# Patient Record
Sex: FEMALE | Race: BLACK OR AFRICAN AMERICAN | NOT HISPANIC OR LATINO
[De-identification: names, ages, dates, MRNs, and addresses within clinical notes are randomized per-mention and may not be internally consistent; named-entity substitution may affect disease eponyms.]

---

## 2019-07-02 PROBLEM — Z00.00 ENCOUNTER FOR PREVENTIVE HEALTH EXAMINATION: Status: ACTIVE | Noted: 2019-07-02

## 2019-07-03 ENCOUNTER — APPOINTMENT (OUTPATIENT)
Dept: SURGICAL ONCOLOGY | Facility: CLINIC | Age: 84
End: 2019-07-03
Payer: MEDICARE

## 2019-07-03 VITALS
OXYGEN SATURATION: 95 % | SYSTOLIC BLOOD PRESSURE: 94 MMHG | HEIGHT: 63 IN | HEART RATE: 106 BPM | WEIGHT: 105 LBS | BODY MASS INDEX: 18.61 KG/M2 | DIASTOLIC BLOOD PRESSURE: 72 MMHG

## 2019-07-03 DIAGNOSIS — C22.0 LIVER CELL CARCINOMA: ICD-10-CM

## 2019-07-03 PROCEDURE — 99205 OFFICE O/P NEW HI 60 MIN: CPT | Mod: GV

## 2019-07-03 NOTE — END OF VISIT
[Time Spent: ___ minutes] : I have spent [unfilled] minutes of face to face time with the patient [>50% of Time Spent on Counseling for ____] : Greater than 50% of the encounter time was spent on counseling for [unfilled] Statement Selected

## 2019-07-09 ENCOUNTER — OUTPATIENT (OUTPATIENT)
Dept: OUTPATIENT SERVICES | Facility: HOSPITAL | Age: 84
LOS: 1 days | Discharge: HOME | End: 2019-07-09

## 2019-07-09 ENCOUNTER — APPOINTMENT (OUTPATIENT)
Dept: HEMATOLOGY ONCOLOGY | Facility: CLINIC | Age: 84
End: 2019-07-09
Payer: MEDICARE

## 2019-07-09 VITALS
SYSTOLIC BLOOD PRESSURE: 86 MMHG | OXYGEN SATURATION: 98 % | HEART RATE: 156 BPM | RESPIRATION RATE: 19 BRPM | DIASTOLIC BLOOD PRESSURE: 66 MMHG | TEMPERATURE: 98.2 F

## 2019-07-09 DIAGNOSIS — F03.90 UNSPECIFIED DEMENTIA W/OUT BEHAVIORAL DISTURBANCE: ICD-10-CM

## 2019-07-09 DIAGNOSIS — I10 ESSENTIAL (PRIMARY) HYPERTENSION: ICD-10-CM

## 2019-07-09 DIAGNOSIS — Z51.5 ENCOUNTER FOR PALLIATIVE CARE: ICD-10-CM

## 2019-07-09 DIAGNOSIS — I26.99 OTHER PULMONARY EMBOLISM W/OUT ACUTE COR PULMONALE: ICD-10-CM

## 2019-07-09 DIAGNOSIS — C22.9 MALIGNANT NEOPLASM OF LIVER, NOT SPECIFIED AS PRIMARY OR SECONDARY: ICD-10-CM

## 2019-07-09 PROCEDURE — 99205 OFFICE O/P NEW HI 60 MIN: CPT | Mod: GV

## 2019-07-09 RX ORDER — PSYLLIUM HUSK 0.4 G
CAPSULE ORAL
Refills: 0 | Status: ACTIVE | COMMUNITY

## 2019-07-09 RX ORDER — SIMETHICONE CHEW TAB 80 MG 80 MG
80 TABLET ORAL
Refills: 0 | Status: ACTIVE | COMMUNITY

## 2019-07-09 RX ORDER — MULTIVITAMIN
TABLET ORAL
Refills: 0 | Status: ACTIVE | COMMUNITY

## 2019-07-10 PROBLEM — Z51.5 PALLIATIVE CARE BY SPECIALIST: Status: ACTIVE | Noted: 2019-07-10

## 2019-07-11 PROBLEM — F03.90 DEMENTIA: Status: ACTIVE | Noted: 2019-07-11

## 2019-07-11 PROBLEM — I26.99 PULMONARY EMBOLISM: Status: ACTIVE | Noted: 2019-07-11

## 2019-07-11 PROBLEM — I10 BENIGN ESSENTIAL HYPERTENSION: Status: ACTIVE | Noted: 2019-07-11

## 2019-07-11 RX ORDER — ENOXAPARIN SODIUM 300 MG/3ML
INJECTION INTRAVENOUS; SUBCUTANEOUS
Refills: 0 | Status: ACTIVE | COMMUNITY

## 2019-07-11 NOTE — OB HISTORY
[unknown] : the patient is unsure of the date of her LMP [Currently In Menopause] : currently in menopause [___] : Living: [unfilled]

## 2019-07-12 NOTE — RESULTS/DATA
[FreeTextEntry1] : 5/31/19:\par \par Alk phos 375\par 9.8 Hgb \par 132 Na\par AST alt 89 89 \par 5/30/19 CT abd and pelvis Right lower lobar artery PE  Multiple low attenuation heterogenous hepatic lesions measuring up to 12 cm replacing bulk of the parenchyma with extension of tumor thrombus into the IVC and moderate ascites. Cirrhotic liver, may represent HCC vs mets\par Dense Triple vessel CA atherosclerotic calcifications multiple nonobstructing calculi

## 2019-07-12 NOTE — REASON FOR VISIT
[Initial Consultation] : an initial consultation [Family Member] : family member [Other: _____] : [unfilled] [FreeTextEntry2] : second opinion for treatment/plan of care

## 2019-07-12 NOTE — ASSESSMENT
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 [AdvancecareDate] : 7/9/2019

## 2019-07-12 NOTE — HISTORY OF PRESENT ILLNESS
[Disease: _____________________] : Disease: [unfilled] [de-identified] : 84 year old woman with PMH HTN, problems with memory,  presents with her son and a family friend. She was referred from Dr Mujica for a second opinion regarding plan of care. s \par Mrs. Chatman son/HCP and scanned records provide the history. \par 4/2019 Brought to ER from her [employer] service with second episode in 2 weeks of dizziness and weakness and labored breathing; reduced Po intake. EKG and C x R were were unremarkable, lab studies showed mild transaminitis and elevated alkaline phosphatase.\par 5/30/19 Admitted with C/O lower right sided  abdominal pain severe x 2 days, worsening anorexia. Found to have tachycardia, CT showed multiple lever lesions concerning for HCC and PE in RLL.  [de-identified] : She is currently on hospice at home with [facility] in [place]. Her son recalls her oncologist saying that she could be a candidate for treatment if she is more alert.\par Prior to her hospitalization/at baseline (February/March)  she was engaged and interactive and reading the newspaper. She spends most of her time in the chair or bed quietly or prays. Does not complain of pain but did her oral  intake continues to decline, vomited coffee grounds yesterday which resolved after Compazine suppository. \par She appears mildly tachypneic at rest, she has oxygen at home if needed.

## 2019-07-12 NOTE — REVIEW OF SYSTEMS
[Patient Intake Form Reviewed] : Patient intake form was reviewed [Fatigue] : fatigue [Recent Change In Weight] : ~T recent weight change [Shortness Of Breath] : shortness of breath [Abdominal Pain] : abdominal pain [Vomiting] : vomiting [Constipation] : constipation [Difficulty Walking] : difficulty walking [Easy Bleeding] : a tendency for easy bleeding [Easy Bruising] : a tendency for easy bruising [Negative] : Endocrine [FreeTextEntry2] : weight loss receiving IV hydration for total of 3 days  [FreeTextEntry5] : tachycardia; Hx PE and IVC thrombus [FreeTextEntry3] : wears glasses [FreeTextEntry6] : PE [FreeTextEntry7] : gas abdominal pain RUQ [de-identified] : memory loss [de-identified] : mild dementia

## 2019-07-12 NOTE — PHYSICAL EXAM
[Completely disabled. Cannot carry on any self care. Totally confined to bed or chair] : Status 4- Completely disabled. Cannot carry on any self care. Totally confined to bed or chair [Thin] : thin [Normal] : normal appearance, no rash, nodules, vesicles, ulcers, erythema [de-identified] : raciel BAKER [de-identified] : PE on AC [de-identified] : ascites LLQ tenderness to palpation [de-identified] : wasting [de-identified] : oriented to self, family knows address  [de-identified] : interactive, answers simple questions appropriately

## 2019-07-30 PROBLEM — C22.0 PRIMARY HEPATOCELLULAR CARCINOMA OF LIVER: Status: ACTIVE | Noted: 2019-07-10

## 2019-07-30 NOTE — ADDENDUM
[FreeTextEntry1] : I, Miller Cai, acted solely as a scribe for Dr. Se Mujica on this date 07/03/2019.\par

## 2019-07-30 NOTE — CONSULT LETTER
[Dear  ___] : Dear  [unfilled], [Consult Letter:] : I had the pleasure of evaluating your patient, [unfilled]. [Please see my note below.] : Please see my note below. [Sincerely,] : Sincerely, [FreeTextEntry3] : Se Mujica MD FACS\par

## 2019-07-30 NOTE — ASSESSMENT
[FreeTextEntry1] : Suspect locally advanced and metastatic hepatocellular carcinoma given elevated AFP.\par I discussed the extent of her disease with her family and reviewed the CT scan images. \par They understand the extent of her disease which includes tumor thrombus in the IVC\par Given her extensive disease, advanced age, and debilitated condition I do not feel that there are any surgical options.\par I referred her to medical oncology at the [facility] to discuss possible systemic treatment options as well as possible liver directed therapy although I do not feel that she will be a candidate for any treatment given the extent of her disease.\par Donovan Knutson RN GI nurse navigator will arrange medical oncology consultation. \par Pt will continue on home hospice\par RTO PRN

## 2019-07-30 NOTE — PHYSICAL EXAM
[Normal] : supple, no neck mass and thyroid not enlarged [Normal Neck Lymph Nodes] : normal neck lymph nodes  [Normal Supraclavicular Lymph Nodes] : normal supraclavicular lymph nodes [Normal Axillary Lymph Nodes] : normal axillary lymph nodes [Normal Groin Lymph Nodes] : normal groin lymph nodes [Normal] : oriented to person, place and time, with appropriate affect [de-identified] : Abdomen soft, not distended, non tender, no masses, no hepatosplenomegaly, and no ascites.

## 2019-07-30 NOTE — HISTORY OF PRESENT ILLNESS
[de-identified] : 83 y/o presents for an initial consultation for liver cell carcinoma.\par \par CT Abd/Pelvis 5/30/19: \par 1. Right lower lobar artery pulmonary embolus. Recommend further evaluation with CT PE study. \par 2. Multiple low attenuation heterogeneous hepatic lesions measuring up to 12 cm replacing bulk of the hepatic parenchyma with extension of tumor thrombus into the Tc and moderate ascites. In the setting of cirrhotic liver, findings may represent primary HCC versus metastatic disease. Recommend tissue correlation. \par 3. Dense triple vessel coronary artery atherosclerotic calcifications. \par 4. Multiple bilateral nonobstructive renal calculi\par \par Chest X-Ray 5/30/19: No focal consolidation. Right lower lobe atelectasis. \par \par She c/o of pain associated with the tumor and fatigue. Her son reports that her appetite is poor.